# Patient Record
Sex: FEMALE | Race: WHITE | NOT HISPANIC OR LATINO | Employment: FULL TIME | URBAN - NONMETROPOLITAN AREA
[De-identification: names, ages, dates, MRNs, and addresses within clinical notes are randomized per-mention and may not be internally consistent; named-entity substitution may affect disease eponyms.]

---

## 2021-01-27 ENCOUNTER — TELEPHONE (OUTPATIENT)
Dept: OBSTETRICS AND GYNECOLOGY | Facility: CLINIC | Age: 51
End: 2021-01-27

## 2021-01-27 DIAGNOSIS — Z12.31 SCREENING MAMMOGRAM, ENCOUNTER FOR: Primary | ICD-10-CM

## 2021-02-10 ENCOUNTER — OFFICE VISIT (OUTPATIENT)
Dept: OBSTETRICS AND GYNECOLOGY | Facility: CLINIC | Age: 51
End: 2021-02-10
Payer: COMMERCIAL

## 2021-02-10 ENCOUNTER — HOSPITAL ENCOUNTER (OUTPATIENT)
Dept: RADIOLOGY | Facility: HOSPITAL | Age: 51
Discharge: HOME | End: 2021-02-10
Attending: OBSTETRICS & GYNECOLOGY
Payer: COMMERCIAL

## 2021-02-10 VITALS
HEIGHT: 60 IN | BODY MASS INDEX: 33.96 KG/M2 | WEIGHT: 173 LBS | HEIGHT: 60 IN | BODY MASS INDEX: 33.38 KG/M2 | WEIGHT: 170 LBS

## 2021-02-10 DIAGNOSIS — Z12.4 SCREENING FOR MALIGNANT NEOPLASM OF THE CERVIX: ICD-10-CM

## 2021-02-10 DIAGNOSIS — Z01.419 ROUTINE GYNECOLOGICAL EXAMINATION: Primary | ICD-10-CM

## 2021-02-10 DIAGNOSIS — Z12.31 SCREENING MAMMOGRAM, ENCOUNTER FOR: ICD-10-CM

## 2021-02-10 PROCEDURE — 99396 PREV VISIT EST AGE 40-64: CPT | Mod: S$GLB,,, | Performed by: NURSE PRACTITIONER

## 2021-02-10 PROCEDURE — 99999 PR PBB SHADOW E&M-EST. PATIENT-LVL II: ICD-10-PCS | Mod: PBBFAC,,, | Performed by: NURSE PRACTITIONER

## 2021-02-10 PROCEDURE — 99396 PR PREVENTIVE VISIT,EST,40-64: ICD-10-PCS | Mod: S$GLB,,, | Performed by: NURSE PRACTITIONER

## 2021-02-10 PROCEDURE — 77067 SCR MAMMO BI INCL CAD: CPT | Mod: TC

## 2021-02-10 PROCEDURE — 3008F PR BODY MASS INDEX (BMI) DOCUMENTED: ICD-10-PCS | Mod: CPTII,S$GLB,, | Performed by: NURSE PRACTITIONER

## 2021-02-10 PROCEDURE — 99999 PR PBB SHADOW E&M-EST. PATIENT-LVL II: CPT | Mod: PBBFAC,,, | Performed by: NURSE PRACTITIONER

## 2021-02-10 PROCEDURE — 3008F BODY MASS INDEX DOCD: CPT | Mod: CPTII,S$GLB,, | Performed by: NURSE PRACTITIONER

## 2021-02-10 RX ORDER — LOSARTAN POTASSIUM AND HYDROCHLOROTHIAZIDE 25; 100 MG/1; MG/1
1 TABLET ORAL DAILY
COMMUNITY
Start: 2021-01-03

## 2021-02-10 RX ORDER — METOPROLOL TARTRATE 100 MG/1
TABLET ORAL
COMMUNITY
Start: 2021-02-01

## 2021-02-23 LAB
HPV16+18+H RISK 12 DNA CVX-IMP: NORMAL
LIQUID BASED PAP SMEAR, SCREENING: NORMAL

## 2022-06-13 DIAGNOSIS — Z12.31 SCREENING MAMMOGRAM, ENCOUNTER FOR: Primary | ICD-10-CM

## 2022-06-15 ENCOUNTER — HOSPITAL ENCOUNTER (OUTPATIENT)
Dept: RADIOLOGY | Facility: HOSPITAL | Age: 52
Discharge: HOME | End: 2022-06-15
Attending: OBSTETRICS & GYNECOLOGY
Payer: COMMERCIAL

## 2022-06-15 VITALS — HEIGHT: 60 IN | BODY MASS INDEX: 33.96 KG/M2 | WEIGHT: 173 LBS

## 2022-06-15 DIAGNOSIS — Z12.31 SCREENING MAMMOGRAM, ENCOUNTER FOR: ICD-10-CM

## 2022-06-15 PROCEDURE — 77067 SCR MAMMO BI INCL CAD: CPT | Mod: TC

## 2023-07-13 DIAGNOSIS — Z12.31 BREAST CANCER SCREENING BY MAMMOGRAM: Primary | ICD-10-CM

## 2023-07-26 ENCOUNTER — HOSPITAL ENCOUNTER (OUTPATIENT)
Dept: RADIOLOGY | Facility: HOSPITAL | Age: 53
Discharge: HOME | End: 2023-07-26
Attending: OBSTETRICS & GYNECOLOGY
Payer: COMMERCIAL

## 2023-07-26 VITALS — WEIGHT: 173 LBS | HEIGHT: 60 IN | BODY MASS INDEX: 33.96 KG/M2

## 2023-07-26 DIAGNOSIS — Z12.31 BREAST CANCER SCREENING BY MAMMOGRAM: ICD-10-CM

## 2023-07-26 PROCEDURE — 77067 SCR MAMMO BI INCL CAD: CPT | Mod: TC

## 2024-07-31 DIAGNOSIS — Z12.31 BREAST CANCER SCREENING BY MAMMOGRAM: Primary | ICD-10-CM

## 2024-08-01 ENCOUNTER — HOSPITAL ENCOUNTER (OUTPATIENT)
Dept: RADIOLOGY | Facility: HOSPITAL | Age: 54
Discharge: HOME | End: 2024-08-01
Attending: OBSTETRICS & GYNECOLOGY
Payer: COMMERCIAL

## 2024-08-01 VITALS — HEIGHT: 60 IN | BODY MASS INDEX: 33.96 KG/M2 | WEIGHT: 173 LBS

## 2024-08-01 DIAGNOSIS — Z12.31 BREAST CANCER SCREENING BY MAMMOGRAM: ICD-10-CM

## 2024-08-01 PROCEDURE — 77067 SCR MAMMO BI INCL CAD: CPT | Mod: TC

## 2024-08-01 PROCEDURE — 77063 BREAST TOMOSYNTHESIS BI: CPT | Mod: TC

## 2024-10-01 ENCOUNTER — OFFICE VISIT (OUTPATIENT)
Dept: OBSTETRICS AND GYNECOLOGY | Facility: CLINIC | Age: 54
End: 2024-10-01
Payer: COMMERCIAL

## 2024-10-01 VITALS
SYSTOLIC BLOOD PRESSURE: 128 MMHG | DIASTOLIC BLOOD PRESSURE: 89 MMHG | BODY MASS INDEX: 33.96 KG/M2 | HEART RATE: 71 BPM | HEIGHT: 60 IN | WEIGHT: 173 LBS

## 2024-10-01 DIAGNOSIS — E66.9 OBESITY (BMI 30-39.9): Primary | ICD-10-CM

## 2024-10-01 DIAGNOSIS — Z76.89 ENCOUNTER FOR WEIGHT MANAGEMENT: ICD-10-CM

## 2024-10-01 PROCEDURE — 99999 PR PBB SHADOW E&M-EST. PATIENT-LVL III: CPT | Mod: PBBFAC,,, | Performed by: OBSTETRICS & GYNECOLOGY

## 2024-10-01 PROCEDURE — 3074F SYST BP LT 130 MM HG: CPT | Mod: CPTII,S$GLB,, | Performed by: OBSTETRICS & GYNECOLOGY

## 2024-10-01 PROCEDURE — 1160F RVW MEDS BY RX/DR IN RCRD: CPT | Mod: CPTII,S$GLB,, | Performed by: OBSTETRICS & GYNECOLOGY

## 2024-10-01 PROCEDURE — 3008F BODY MASS INDEX DOCD: CPT | Mod: CPTII,S$GLB,, | Performed by: OBSTETRICS & GYNECOLOGY

## 2024-10-01 PROCEDURE — 99203 OFFICE O/P NEW LOW 30 MIN: CPT | Mod: S$GLB,,, | Performed by: OBSTETRICS & GYNECOLOGY

## 2024-10-01 PROCEDURE — 1159F MED LIST DOCD IN RCRD: CPT | Mod: CPTII,S$GLB,, | Performed by: OBSTETRICS & GYNECOLOGY

## 2024-10-01 PROCEDURE — 3079F DIAST BP 80-89 MM HG: CPT | Mod: CPTII,S$GLB,, | Performed by: OBSTETRICS & GYNECOLOGY

## 2024-10-01 NOTE — PROGRESS NOTES
Subjective:    Patient ID: Huong Vigil is a 54 y.o. y.o. female    Chief Complaint:   Chief Complaint   Patient presents with    Weight Management NEW     Patient states that she would like to begin WM. States that she has done adipex in the past but is now interested in injections.        History of Present Illness:  Huong presents today for discussion of weight management.  She has recently lost about 20 lb with portion control and she notes that she is now plateauing.  She feels she needs extra help.  She has used Adipex in the past but has had mixed results.  She is interested in compound injectables.      Review of Systems   Constitutional:  Negative for chills and fever.   Respiratory:  Negative for shortness of breath.    Cardiovascular:  Negative for chest pain.   Gastrointestinal:  Negative for constipation.   Genitourinary:  Negative for pelvic pain.   Neurological:  Negative for headaches.         Objective:    Vital Signs:  Vitals:    10/01/24 0857   BP: 128/89   Pulse: 71     Wt Readings from Last 1 Encounters:   10/01/24 78.5 kg (173 lb)     Body mass index is 33.79 kg/m².    Physical Exam:  General:  alert,no distress   Respiratory:  clear to auscultation bilaterally   Heart:  regular rate and rhythm, S1, S2 normal   Abdomen:  Soft, obese non-tender.   Extremities: No cyanosis, clubbing, edema         Options discussed and she would like to proceed with Mounjaro.  Use and side effects of medication discussed and all questions answered    I spent a total of 30 minutes on the day of the visit.  This includes face to face time and non-face to face time preparing to see the patient (eg, review of tests), obtaining and/or reviewing separately obtained history, documenting clinical information in the electronic or other health record, independently interpreting results and communicating results to the patient/family/caregiver, or care coordinator.           Assessment:      1. Obesity (BMI  30-39.9)    2. Encounter for weight management          Plan:      Obesity (BMI 30-39.9)    Encounter for weight management      Compound tirzepatide 2.5 mg SQ every 7 days     Thu Bhatt MD, FACOG   10/01/2024 9:01 AM

## 2025-01-09 ENCOUNTER — OFFICE VISIT (OUTPATIENT)
Dept: OBSTETRICS AND GYNECOLOGY | Facility: CLINIC | Age: 55
End: 2025-01-09
Payer: COMMERCIAL

## 2025-01-09 VITALS
HEIGHT: 60 IN | BODY MASS INDEX: 34.16 KG/M2 | WEIGHT: 174 LBS | SYSTOLIC BLOOD PRESSURE: 173 MMHG | DIASTOLIC BLOOD PRESSURE: 93 MMHG | HEART RATE: 65 BPM

## 2025-01-09 DIAGNOSIS — E66.9 OBESITY (BMI 30-39.9): Primary | ICD-10-CM

## 2025-01-09 DIAGNOSIS — Z76.89 ENCOUNTER FOR WEIGHT MANAGEMENT: ICD-10-CM

## 2025-01-09 PROCEDURE — 1159F MED LIST DOCD IN RCRD: CPT | Mod: CPTII,S$GLB,, | Performed by: OBSTETRICS & GYNECOLOGY

## 2025-01-09 PROCEDURE — 1160F RVW MEDS BY RX/DR IN RCRD: CPT | Mod: CPTII,S$GLB,, | Performed by: OBSTETRICS & GYNECOLOGY

## 2025-01-09 PROCEDURE — 99999 PR PBB SHADOW E&M-EST. PATIENT-LVL III: CPT | Mod: PBBFAC,,, | Performed by: OBSTETRICS & GYNECOLOGY

## 2025-01-09 PROCEDURE — 3080F DIAST BP >= 90 MM HG: CPT | Mod: CPTII,S$GLB,, | Performed by: OBSTETRICS & GYNECOLOGY

## 2025-01-09 PROCEDURE — 99213 OFFICE O/P EST LOW 20 MIN: CPT | Mod: S$GLB,,, | Performed by: OBSTETRICS & GYNECOLOGY

## 2025-01-09 PROCEDURE — 3008F BODY MASS INDEX DOCD: CPT | Mod: CPTII,S$GLB,, | Performed by: OBSTETRICS & GYNECOLOGY

## 2025-01-09 PROCEDURE — 3077F SYST BP >= 140 MM HG: CPT | Mod: CPTII,S$GLB,, | Performed by: OBSTETRICS & GYNECOLOGY

## 2025-01-09 NOTE — PROGRESS NOTES
Subjective:    Patient ID: Huong Vigil is a 54 y.o. y.o. female    Chief Complaint:   Chief Complaint   Patient presents with    w/m       History of Present Illness:  Huong presents today for follow up of tirzepatide.  She is on compound tirzepatide 2.5 mg. She is tolerating meds well.  No side effects noted. She would like to continue medication      Review of Systems   Constitutional:  Negative for chills and fever.   Respiratory:  Negative for shortness of breath.    Cardiovascular:  Negative for chest pain.   Gastrointestinal:  Negative for constipation.   Neurological:  Negative for headaches.         Objective:    Vital Signs:  Vitals:    01/09/25 1452   BP: (!) 173/93   Pulse: 65     Wt Readings from Last 1 Encounters:   01/09/25 78.9 kg (174 lb)     Body mass index is 33.98 kg/m².    Physical Exam:  General:  alert,no distress   Respiratory:  clear to auscultation bilaterally   Heart:  regular rate and rhythm, S1, S2 normal   Abdomen:  Soft, obese non-tender.   Extremities: No cyanosis, clubbing, edema       Use and side effects reiterated and all questions answered.  will increase to 5 mg    I spent a total of 20 minutes on the day of the visit.  This includes face to face time and non-face to face time preparing to see the patient (eg, review of tests), obtaining and/or reviewing separately obtained history, documenting clinical information in the electronic or other health record, independently interpreting results and communicating results to the patient/family/caregiver, or care coordinator.           Assessment:      1. Obesity (BMI 30-39.9)    2. Encounter for weight management          Plan:      Obesity (BMI 30-39.9)    Encounter for weight management           Thu Bhatt MD, FACOG   01/09/2025 3:01 PM

## 2025-02-18 ENCOUNTER — OFFICE VISIT (OUTPATIENT)
Dept: OBSTETRICS AND GYNECOLOGY | Facility: CLINIC | Age: 55
End: 2025-02-18
Payer: COMMERCIAL

## 2025-02-18 VITALS
WEIGHT: 175 LBS | HEIGHT: 60 IN | HEART RATE: 67 BPM | SYSTOLIC BLOOD PRESSURE: 133 MMHG | DIASTOLIC BLOOD PRESSURE: 77 MMHG | BODY MASS INDEX: 34.36 KG/M2

## 2025-02-18 DIAGNOSIS — E66.9 OBESITY (BMI 30-39.9): Primary | ICD-10-CM

## 2025-02-18 DIAGNOSIS — Z76.89 ENCOUNTER FOR WEIGHT MANAGEMENT: ICD-10-CM

## 2025-02-18 NOTE — PROGRESS NOTES
Subjective:    Patient ID: Huong Vigil is a 54 y.o. y.o. female    Chief Complaint:   Chief Complaint   Patient presents with    w/m       History of Present Illness:  Huong presents today for follow up of weight management medication  On tirzepatide 5 mg every 7 days.  She did not lose any weight over this past but she also has been at a few [employer] parties.  She states that she has felt well taking the medication, but does have occasional heartburn and burping.  She would like to continue medication and increase dosage      Review of Systems   Constitutional:  Negative for chills and fever.   Respiratory:  Negative for shortness of breath.    Cardiovascular:  Negative for chest pain.   Gastrointestinal:  Negative for constipation.   Neurological:  Negative for headaches.         Objective:    Vital Signs:  Vitals:    02/18/25 1334   BP: 133/77   Pulse: 67     Wt Readings from Last 1 Encounters:   02/18/25 79.4 kg (175 lb)     Body mass index is 34.18 kg/m².    Physical Exam:  General:  alert,no distress   Respiratory:  clear to auscultation bilaterally   Heart:  regular rate and rhythm, S1, S2 normal   Abdomen:  Soft, obese non-tender.   Extremities: No cyanosis, clubbing, edema       Use and side effects of medication reiterated and she desires to continue.  All questions answered.  Will increase dosage to 7.5 mg every 7 days    I spent a total of 20 minutes on the day of the visit.  This includes face to face time and non-face to face time preparing to see the patient (eg, review of tests), obtaining and/or reviewing separately obtained history, documenting clinical information in the electronic or other health record, independently interpreting results and communicating results to the patient/family/caregiver, or care coordinator.         Assessment:      1. Obesity (BMI 30-39.9)    2. Encounter for weight management          Plan:      Obesity (BMI 30-39.9)    Encounter for weight  management    Tirzepatide 7.5 mg SQ every 7 days       Thu Bhatt MD, FACOG   02/18/2025 1:39 PM

## 2025-03-18 ENCOUNTER — OFFICE VISIT (OUTPATIENT)
Dept: OBSTETRICS AND GYNECOLOGY | Facility: CLINIC | Age: 55
End: 2025-03-18
Payer: COMMERCIAL

## 2025-03-18 VITALS
BODY MASS INDEX: 34.16 KG/M2 | HEIGHT: 60 IN | HEART RATE: 80 BPM | SYSTOLIC BLOOD PRESSURE: 128 MMHG | DIASTOLIC BLOOD PRESSURE: 78 MMHG | WEIGHT: 174 LBS

## 2025-03-18 DIAGNOSIS — E66.9 OBESITY (BMI 30-39.9): Primary | ICD-10-CM

## 2025-03-18 DIAGNOSIS — Z76.89 ENCOUNTER FOR WEIGHT MANAGEMENT: ICD-10-CM

## 2025-03-18 PROCEDURE — 99999 PR PBB SHADOW E&M-EST. PATIENT-LVL III: CPT | Mod: PBBFAC,,, | Performed by: OBSTETRICS & GYNECOLOGY

## 2025-03-18 PROCEDURE — 3078F DIAST BP <80 MM HG: CPT | Mod: CPTII,S$GLB,, | Performed by: OBSTETRICS & GYNECOLOGY

## 2025-03-18 PROCEDURE — 1159F MED LIST DOCD IN RCRD: CPT | Mod: CPTII,S$GLB,, | Performed by: OBSTETRICS & GYNECOLOGY

## 2025-03-18 PROCEDURE — 3074F SYST BP LT 130 MM HG: CPT | Mod: CPTII,S$GLB,, | Performed by: OBSTETRICS & GYNECOLOGY

## 2025-03-18 PROCEDURE — 3008F BODY MASS INDEX DOCD: CPT | Mod: CPTII,S$GLB,, | Performed by: OBSTETRICS & GYNECOLOGY

## 2025-03-18 PROCEDURE — 1160F RVW MEDS BY RX/DR IN RCRD: CPT | Mod: CPTII,S$GLB,, | Performed by: OBSTETRICS & GYNECOLOGY

## 2025-03-18 PROCEDURE — 99213 OFFICE O/P EST LOW 20 MIN: CPT | Mod: S$GLB,,, | Performed by: OBSTETRICS & GYNECOLOGY

## 2025-03-18 NOTE — PROGRESS NOTES
Subjective:    Patient ID: Huong Vigil is a 54 y.o. y.o. female    Chief Complaint:   Chief Complaint   Patient presents with    Follow-up     Wt mgt f/u. Was using [facility] but they do not offer the Tirzepatide. Pt states she is willing to switch to the Semaglutide as she has not seen much result with the Tirzepatide anyhow.        History of Present Illness:  Huong presents today for follow up of weight loss medication  Currently on 7.5 mg tirzepatide every 7 days. Has lost some weight but states feels like it is increasing again.  She gets her medication from a compounding pharmacy and it is no longer available.  She would like to switch to semaglutide.  She has had no side effects of the medications so far      Review of Systems   Constitutional:  Negative for chills and fever.   Respiratory:  Negative for shortness of breath.    Cardiovascular:  Negative for chest pain.   Gastrointestinal:  Negative for constipation.   Neurological:  Negative for headaches.         Objective:    Vital Signs:  Vitals:    03/18/25 1322   BP: 128/78   Pulse: 80     Wt Readings from Last 1 Encounters:   03/18/25 78.9 kg (174 lb)     Body mass index is 33.98 kg/m².    Physical Exam:  General:  alert,no distress   Respiratory:  clear to auscultation bilaterally   Heart:  regular rate and rhythm, S1, S2 normal   Abdomen:  Soft, obese non-tender.   Extremities: No cyanosis, clubbing, edema       Use and side effects reiterated and we will change to 1 mg semaglutide every 7 days.  Reinforced importance of diet and exercise.  All questions answered    I spent a total of 20 minutes on the day of the visit.  This includes face to face time and non-face to face time preparing to see the patient (eg, review of tests), obtaining and/or reviewing separately obtained history, documenting clinical information in the electronic or other health record, independently interpreting results and communicating results to the  patient/family/caregiver, or care coordinator.           Assessment:      1. Obesity (BMI 30-39.9)    2. Encounter for weight management          Plan:      Obesity (BMI 30-39.9)  -     semaglutide (OZEMPIC) 1 mg/dose (4 mg/3 mL); Inject 1 mg into the skin every 7 days.    Encounter for weight management  -     semaglutide (OZEMPIC) 1 mg/dose (4 mg/3 mL); Inject 1 mg into the skin every 7 days.    RTC 2 months       Thu Bhatt MD, FACOG   03/18/2025 1:35 PM

## 2025-05-20 ENCOUNTER — OFFICE VISIT (OUTPATIENT)
Dept: OBSTETRICS AND GYNECOLOGY | Facility: CLINIC | Age: 55
End: 2025-05-20
Payer: COMMERCIAL

## 2025-05-20 VITALS
DIASTOLIC BLOOD PRESSURE: 84 MMHG | SYSTOLIC BLOOD PRESSURE: 131 MMHG | HEART RATE: 69 BPM | BODY MASS INDEX: 33.38 KG/M2 | HEIGHT: 60 IN | WEIGHT: 170 LBS

## 2025-05-20 DIAGNOSIS — Z76.89 ENCOUNTER FOR WEIGHT MANAGEMENT: ICD-10-CM

## 2025-05-20 DIAGNOSIS — E66.9 OBESITY (BMI 30-39.9): Primary | ICD-10-CM

## 2025-05-20 PROCEDURE — 99999 PR PBB SHADOW E&M-EST. PATIENT-LVL III: CPT | Mod: PBBFAC,,, | Performed by: OBSTETRICS & GYNECOLOGY

## 2025-05-20 PROCEDURE — 3075F SYST BP GE 130 - 139MM HG: CPT | Mod: CPTII,S$GLB,, | Performed by: OBSTETRICS & GYNECOLOGY

## 2025-05-20 PROCEDURE — 99213 OFFICE O/P EST LOW 20 MIN: CPT | Mod: S$GLB,,, | Performed by: OBSTETRICS & GYNECOLOGY

## 2025-05-20 PROCEDURE — 1160F RVW MEDS BY RX/DR IN RCRD: CPT | Mod: CPTII,S$GLB,, | Performed by: OBSTETRICS & GYNECOLOGY

## 2025-05-20 PROCEDURE — 1159F MED LIST DOCD IN RCRD: CPT | Mod: CPTII,S$GLB,, | Performed by: OBSTETRICS & GYNECOLOGY

## 2025-05-20 PROCEDURE — 3008F BODY MASS INDEX DOCD: CPT | Mod: CPTII,S$GLB,, | Performed by: OBSTETRICS & GYNECOLOGY

## 2025-05-20 PROCEDURE — 3079F DIAST BP 80-89 MM HG: CPT | Mod: CPTII,S$GLB,, | Performed by: OBSTETRICS & GYNECOLOGY

## 2025-05-20 RX ORDER — PHENTERMINE HYDROCHLORIDE 37.5 MG/1
37.5 TABLET ORAL
Qty: 30 TABLET | Refills: 0 | Status: SHIPPED | OUTPATIENT
Start: 2025-05-20 | End: 2025-06-19

## 2025-05-20 NOTE — PROGRESS NOTES
Subjective:    Patient ID: Huong Vigil is a 54 y.o. y.o. female    Chief Complaint:   Chief Complaint   Patient presents with    Follow-up     Semaglutide f/u. States she finds she is not losing as much as she'd like. Will need to use [facility] is decides to stay with medication. Pr inquiring about adding Adipex for a while if able with the shot to help for a bit.        History of Present Illness:  Huong presents today for follow up of semaglutide.  Patient states that she is not losing as much weight as she would like.  She feels that she might be plateauing.  She has had a few side effects and is not anxious to increase the dosage      Review of Systems   Constitutional:  Negative for chills and fever.   Respiratory:  Negative for shortness of breath.    Cardiovascular:  Negative for chest pain.   Gastrointestinal:  Negative for constipation.   Neurological:  Negative for headaches.         Objective:    Vital Signs:  Vitals:    05/20/25 1320   BP: 131/84   Pulse: 69     Wt Readings from Last 1 Encounters:   05/20/25 77.1 kg (170 lb)     Body mass index is 33.2 kg/m².    Physical Exam:  General:  alert,no distress   Respiratory:  clear to auscultation bilaterally   Heart:  regular rate and rhythm, S1, S2 normal   Abdomen:  Soft, obese non-tender.   Extremities: No cyanosis, clubbing, edema       Continue current doses semaglutide due to some of the side effects she is having.  Will add Adipex to help for a short while.  Use and side effects discussed and all questions answered    I spent a total of 20 minutes on the day of the visit.  This includes face to face time and non-face to face time preparing to see the patient (eg, review of tests), obtaining and/or reviewing separately obtained history, documenting clinical information in the electronic or other health record, independently interpreting results and communicating results to the patient/family/caregiver, or care coordinator.            Assessment:      1. Obesity (BMI 30-39.9)    2. Encounter for weight management          Plan:      Obesity (BMI 30-39.9)  -     phentermine (ADIPEX-P) 37.5 mg tablet; Take 1 tablet (37.5 mg total) by mouth before breakfast.  Dispense: 30 tablet; Refill: 0    Encounter for weight management  -     phentermine (ADIPEX-P) 37.5 mg tablet; Take 1 tablet (37.5 mg total) by mouth before breakfast.  Dispense: 30 tablet; Refill: 0           Thu Bhatt MD, FACOG   05/20/2025 1:29 PM

## 2025-07-21 ENCOUNTER — OFFICE VISIT (OUTPATIENT)
Dept: OBSTETRICS AND GYNECOLOGY | Facility: CLINIC | Age: 55
End: 2025-07-21
Payer: COMMERCIAL

## 2025-07-21 VITALS
DIASTOLIC BLOOD PRESSURE: 85 MMHG | SYSTOLIC BLOOD PRESSURE: 146 MMHG | HEIGHT: 60 IN | WEIGHT: 162 LBS | BODY MASS INDEX: 31.8 KG/M2 | HEART RATE: 72 BPM

## 2025-07-21 DIAGNOSIS — E66.9 OBESITY (BMI 30-39.9): Primary | ICD-10-CM

## 2025-07-21 DIAGNOSIS — R12 HEARTBURN: ICD-10-CM

## 2025-07-21 DIAGNOSIS — Z76.89 ENCOUNTER FOR WEIGHT MANAGEMENT: ICD-10-CM

## 2025-07-21 PROCEDURE — 3077F SYST BP >= 140 MM HG: CPT | Mod: CPTII,S$GLB,, | Performed by: OBSTETRICS & GYNECOLOGY

## 2025-07-21 PROCEDURE — 3079F DIAST BP 80-89 MM HG: CPT | Mod: CPTII,S$GLB,, | Performed by: OBSTETRICS & GYNECOLOGY

## 2025-07-21 PROCEDURE — 99213 OFFICE O/P EST LOW 20 MIN: CPT | Mod: S$GLB,,, | Performed by: OBSTETRICS & GYNECOLOGY

## 2025-07-21 PROCEDURE — 3008F BODY MASS INDEX DOCD: CPT | Mod: CPTII,S$GLB,, | Performed by: OBSTETRICS & GYNECOLOGY

## 2025-07-21 PROCEDURE — 1159F MED LIST DOCD IN RCRD: CPT | Mod: CPTII,S$GLB,, | Performed by: OBSTETRICS & GYNECOLOGY

## 2025-07-21 PROCEDURE — 1160F RVW MEDS BY RX/DR IN RCRD: CPT | Mod: CPTII,S$GLB,, | Performed by: OBSTETRICS & GYNECOLOGY

## 2025-07-21 PROCEDURE — 99999 PR PBB SHADOW E&M-EST. PATIENT-LVL III: CPT | Mod: PBBFAC,,, | Performed by: OBSTETRICS & GYNECOLOGY

## 2025-07-21 RX ORDER — PHENTERMINE HYDROCHLORIDE 37.5 MG/1
37.5 TABLET ORAL
COMMUNITY
End: 2025-07-25 | Stop reason: SDUPTHER

## 2025-07-21 RX ORDER — SEMAGLUTIDE 1 MG/0.2ML
SYRINGE (ML) SUBCUTANEOUS
Qty: 1 ML | Refills: 1 | Status: SHIPPED | OUTPATIENT
Start: 2025-07-21

## 2025-07-21 NOTE — PROGRESS NOTES
Subjective:    Patient ID: Huong Vigil is a 55 y.o. y.o. female    Chief Complaint:   Chief Complaint   Patient presents with    f/u       History of Present Illness:  Huong presents today for follow up of semaglutide for weight loss.  Patient states she is doing well with the medication and has lost 8 lb in the past 2 months.  She desires to continue medication.  She does note some heartburn for a few days after injection.  Given these side effects we have agreed upon continuing current dose for now.      Review of Systems   Constitutional:  Negative for chills and fever.   Respiratory:  Negative for shortness of breath.    Cardiovascular:  Negative for chest pain.   Gastrointestinal:  Negative for constipation.        Heartburn   Neurological:  Negative for headaches.         Objective:    Vital Signs:  Vitals:    07/21/25 1331   BP: (!) 146/85   Pulse: 72     Wt Readings from Last 1 Encounters:   07/21/25 73.5 kg (162 lb)     Body mass index is 31.64 kg/m².    Physical Exam:  General:  alert,no distress   Respiratory:  clear to auscultation bilaterally   Heart:  regular rate and rhythm, S1, S2 normal   Abdomen:  Soft, obese non-tender.   Extremities: No cyanosis, clubbing, edema         Use and side effects of the medication were reiterated and she desires to continue with the current dose for now.  She will contact me if her heartburn starts to do subside to where she would want to increase her dosage.  All questions answered    I spent a total of 20 minutes on the day of the visit.  This includes face to face time and non-face to face time preparing to see the patient (eg, review of tests), obtaining and/or reviewing separately obtained history, documenting clinical information in the electronic or other health record, independently interpreting results and communicating results to the patient/family/caregiver, or care coordinator.         Assessment:      1. Obesity (BMI 30-39.9)    2. Encounter  for weight management    3. Heartburn          Plan:      Obesity (BMI 30-39.9)  -     semaglutide 1 mg/0.2 mL Syrg; Inject 20 units sub Q every 7 days  Dispense: 1 mL; Refill: 1    Encounter for weight management  -     semaglutide 1 mg/0.2 mL Syrg; Inject 20 units sub Q every 7 days  Dispense: 1 mL; Refill: 1    Heartburn      RTC 2 months for follow up       Thu Bhatt MD, FACOG   07/21/2025 2:00 PM

## 2025-07-24 ENCOUNTER — TELEPHONE (OUTPATIENT)
Dept: OBSTETRICS AND GYNECOLOGY | Facility: CLINIC | Age: 55
End: 2025-07-24
Payer: COMMERCIAL

## 2025-07-24 DIAGNOSIS — E66.9 OBESITY (BMI 30-39.9): Primary | ICD-10-CM

## 2025-07-25 RX ORDER — PHENTERMINE HYDROCHLORIDE 37.5 MG/1
37.5 TABLET ORAL
Qty: 30 TABLET | Refills: 0 | Status: SHIPPED | OUTPATIENT
Start: 2025-07-25